# Patient Record
Sex: MALE | Race: WHITE | NOT HISPANIC OR LATINO | Employment: FULL TIME | URBAN - METROPOLITAN AREA
[De-identification: names, ages, dates, MRNs, and addresses within clinical notes are randomized per-mention and may not be internally consistent; named-entity substitution may affect disease eponyms.]

---

## 2017-01-01 ENCOUNTER — HOSPITAL ENCOUNTER (EMERGENCY)
Facility: HOSPITAL | Age: 47
Discharge: HOME | End: 2017-01-01
Attending: EMERGENCY MEDICINE | Admitting: EMERGENCY MEDICINE

## 2017-01-01 ENCOUNTER — APPOINTMENT (OUTPATIENT)
Dept: CT IMAGING | Facility: HOSPITAL | Age: 47
End: 2017-01-01

## 2017-01-01 VITALS
WEIGHT: 180 LBS | RESPIRATION RATE: 18 BRPM | BODY MASS INDEX: 27.28 KG/M2 | SYSTOLIC BLOOD PRESSURE: 123 MMHG | HEIGHT: 68 IN | OXYGEN SATURATION: 98 % | HEART RATE: 64 BPM | TEMPERATURE: 98 F | DIASTOLIC BLOOD PRESSURE: 89 MMHG

## 2017-01-01 DIAGNOSIS — R10.31 RIGHT LOWER QUADRANT ABDOMINAL PAIN: Primary | ICD-10-CM

## 2017-01-01 DIAGNOSIS — R31.9 HEMATURIA: ICD-10-CM

## 2017-01-01 LAB
ALBUMIN SERPL-MCNC: 4.8 G/DL (ref 3.2–4.8)
ALBUMIN/GLOB SERPL: 1.7 G/DL (ref 1.5–2.5)
ALP SERPL-CCNC: 66 U/L (ref 25–100)
ALT SERPL W P-5'-P-CCNC: 23 U/L (ref 7–40)
ANION GAP SERPL CALCULATED.3IONS-SCNC: 8 MMOL/L (ref 3–11)
AST SERPL-CCNC: 31 U/L (ref 0–33)
BACTERIA UR QL AUTO: ABNORMAL /HPF
BASOPHILS # BLD AUTO: 0.02 10*3/MM3 (ref 0–0.2)
BASOPHILS NFR BLD AUTO: 0.3 % (ref 0–1)
BILIRUB SERPL-MCNC: 2.1 MG/DL (ref 0.3–1.2)
BILIRUB UR QL STRIP: ABNORMAL
BUN BLD-MCNC: 14 MG/DL (ref 9–23)
BUN/CREAT SERPL: 20 (ref 7–25)
CALCIUM SPEC-SCNC: 10 MG/DL (ref 8.7–10.4)
CHLORIDE SERPL-SCNC: 105 MMOL/L (ref 99–109)
CLARITY UR: ABNORMAL
CO2 SERPL-SCNC: 30 MMOL/L (ref 20–31)
COLOR UR: YELLOW
CREAT BLD-MCNC: 0.7 MG/DL (ref 0.6–1.3)
DEPRECATED RDW RBC AUTO: 42.2 FL (ref 37–54)
EOSINOPHIL # BLD AUTO: 0.25 10*3/MM3 (ref 0.1–0.3)
EOSINOPHIL NFR BLD AUTO: 3.6 % (ref 0–3)
ERYTHROCYTE [DISTWIDTH] IN BLOOD BY AUTOMATED COUNT: 12.7 % (ref 11.3–14.5)
GFR SERPL CREATININE-BSD FRML MDRD: 123 ML/MIN/1.73
GLOBULIN UR ELPH-MCNC: 2.8 GM/DL
GLUCOSE BLD-MCNC: 89 MG/DL (ref 70–100)
GLUCOSE UR STRIP-MCNC: NEGATIVE MG/DL
HCT VFR BLD AUTO: 44 % (ref 38.9–50.9)
HGB BLD-MCNC: 15.4 G/DL (ref 13.1–17.5)
HGB UR QL STRIP.AUTO: ABNORMAL
HOLD SPECIMEN: NORMAL
HOLD SPECIMEN: NORMAL
HYALINE CASTS UR QL AUTO: ABNORMAL /LPF
IMM GRANULOCYTES # BLD: 0.02 10*3/MM3 (ref 0–0.03)
IMM GRANULOCYTES NFR BLD: 0.3 % (ref 0–0.6)
KETONES UR QL STRIP: ABNORMAL
LEUKOCYTE ESTERASE UR QL STRIP.AUTO: ABNORMAL
LIPASE SERPL-CCNC: 33 U/L (ref 6–51)
LYMPHOCYTES # BLD AUTO: 1.59 10*3/MM3 (ref 0.6–4.8)
LYMPHOCYTES NFR BLD AUTO: 23.1 % (ref 24–44)
MCH RBC QN AUTO: 31.6 PG (ref 27–31)
MCHC RBC AUTO-ENTMCNC: 35 G/DL (ref 32–36)
MCV RBC AUTO: 90.3 FL (ref 80–99)
MONOCYTES # BLD AUTO: 0.55 10*3/MM3 (ref 0–1)
MONOCYTES NFR BLD AUTO: 8 % (ref 0–12)
NEUTROPHILS # BLD AUTO: 4.45 10*3/MM3 (ref 1.5–8.3)
NEUTROPHILS NFR BLD AUTO: 64.7 % (ref 41–71)
NITRITE UR QL STRIP: NEGATIVE
PH UR STRIP.AUTO: 5.5 [PH] (ref 5–8)
PLATELET # BLD AUTO: 235 10*3/MM3 (ref 150–450)
PMV BLD AUTO: 8.8 FL (ref 6–12)
POTASSIUM BLD-SCNC: 3.9 MMOL/L (ref 3.5–5.5)
PROT SERPL-MCNC: 7.6 G/DL (ref 5.7–8.2)
PROT UR QL STRIP: NEGATIVE
RBC # BLD AUTO: 4.87 10*6/MM3 (ref 4.2–5.76)
RBC # UR: ABNORMAL /HPF
REF LAB TEST METHOD: ABNORMAL
SODIUM BLD-SCNC: 143 MMOL/L (ref 132–146)
SP GR UR STRIP: 1.03 (ref 1–1.03)
SQUAMOUS #/AREA URNS HPF: ABNORMAL /HPF
UROBILINOGEN UR QL STRIP: ABNORMAL
WBC NRBC COR # BLD: 6.88 10*3/MM3 (ref 3.5–10.8)
WBC UR QL AUTO: ABNORMAL /HPF
WHOLE BLOOD HOLD SPECIMEN: NORMAL
WHOLE BLOOD HOLD SPECIMEN: NORMAL

## 2017-01-01 PROCEDURE — 0 IOPAMIDOL 61 % SOLUTION: Performed by: EMERGENCY MEDICINE

## 2017-01-01 PROCEDURE — 99284 EMERGENCY DEPT VISIT MOD MDM: CPT

## 2017-01-01 PROCEDURE — 81001 URINALYSIS AUTO W/SCOPE: CPT | Performed by: EMERGENCY MEDICINE

## 2017-01-01 PROCEDURE — 87086 URINE CULTURE/COLONY COUNT: CPT | Performed by: EMERGENCY MEDICINE

## 2017-01-01 PROCEDURE — 85025 COMPLETE CBC W/AUTO DIFF WBC: CPT | Performed by: EMERGENCY MEDICINE

## 2017-01-01 PROCEDURE — 83690 ASSAY OF LIPASE: CPT | Performed by: EMERGENCY MEDICINE

## 2017-01-01 PROCEDURE — 96360 HYDRATION IV INFUSION INIT: CPT

## 2017-01-01 PROCEDURE — 74177 CT ABD & PELVIS W/CONTRAST: CPT

## 2017-01-01 PROCEDURE — 25510000001 DIATRIZOATE MEGLUMINE & SODIUM PER 1 ML: Performed by: NURSE PRACTITIONER

## 2017-01-01 PROCEDURE — 80053 COMPREHEN METABOLIC PANEL: CPT | Performed by: EMERGENCY MEDICINE

## 2017-01-01 RX ORDER — SODIUM CHLORIDE 0.9 % (FLUSH) 0.9 %
10 SYRINGE (ML) INJECTION AS NEEDED
Status: DISCONTINUED | OUTPATIENT
Start: 2017-01-01 | End: 2017-01-01 | Stop reason: HOSPADM

## 2017-01-01 RX ADMIN — SODIUM CHLORIDE 1000 ML: 9 INJECTION, SOLUTION INTRAVENOUS at 16:12

## 2017-01-01 RX ADMIN — DIATRIZOATE MEGLUMINE AND DIATRIZOATE SODIUM 15 ML: 660; 100 LIQUID ORAL; RECTAL at 16:13

## 2017-01-01 RX ADMIN — IOPAMIDOL 85 ML: 612 INJECTION, SOLUTION INTRAVENOUS at 17:37

## 2017-01-01 NOTE — ED PROVIDER NOTES
Subjective   Patient is a 46 y.o. male presenting with abdominal pain.   Abdominal Pain   Pain location:  RLQ  Pain quality: aching    Pain radiation: right flank.  Pain severity:  Moderate  Onset quality:  Gradual  Duration:  1 day  Timing:  Constant  Progression:  Unchanged  Chronicity:  New  Relieved by:  Nothing  Worsened by:  Nothing  Associated symptoms: no diarrhea, no nausea and no shortness of breath        Review of Systems   Respiratory: Negative for shortness of breath.    Gastrointestinal: Positive for abdominal pain. Negative for diarrhea and nausea.   All other systems reviewed and are negative.      History reviewed. No pertinent past medical history.    No Known Allergies    Past Surgical History   Procedure Laterality Date   • Cholecystectomy         History reviewed. No pertinent family history.    Social History     Social History   • Marital status:      Spouse name: N/A   • Number of children: N/A   • Years of education: N/A     Social History Main Topics   • Smoking status: Former Smoker   • Smokeless tobacco: Never Used      Comment: QUIT OVER 20 YEARS AGO   • Alcohol use No   • Drug use: No   • Sexual activity: Defer     Other Topics Concern   • None     Social History Narrative   • None           Objective   Physical Exam   Constitutional: He is oriented to person, place, and time. He appears well-developed and well-nourished.   HENT:   Head: Normocephalic and atraumatic.   Right Ear: External ear normal.   Left Ear: External ear normal.   Nose: Nose normal.   Mouth/Throat: Oropharynx is clear and moist.   Eyes: Conjunctivae and EOM are normal. Pupils are equal, round, and reactive to light.   Neck: Normal range of motion. Neck supple.   Cardiovascular: Normal rate, regular rhythm, normal heart sounds and intact distal pulses.    Pulmonary/Chest: Effort normal and breath sounds normal.   Abdominal: Soft. Bowel sounds are normal. There is tenderness in the right lower quadrant. There  is no rebound.   Musculoskeletal: Normal range of motion.   Neurological: He is alert and oriented to person, place, and time.   Skin: Skin is warm and dry.   Psychiatric: He has a normal mood and affect. His behavior is normal. Judgment normal.       Procedures         ED Course  ED Course   Comment By Time   CT neg for appy or other acute. Pt reports pain feeling better. Well aware of the ss of worsening condition.    All thankful and agreeable. Fu with pcp this week. ESPERANZA Bell 01/01 1903                  [facility]    Final diagnoses:   Right lower quadrant abdominal pain   Hematuria            ESPERANZA Bell  01/01/17 [id number]

## 2017-01-03 LAB — BACTERIA SPEC AEROBE CULT: NORMAL

## 2019-05-15 RX ORDER — SODIUM, POTASSIUM,MAG SULFATES 17.5-3.13G
SOLUTION, RECONSTITUTED, ORAL ORAL
Qty: 2 BOTTLE | Refills: 0 | Status: SHIPPED | OUTPATIENT
Start: 2019-05-15 | End: 2022-03-21

## 2019-06-04 ENCOUNTER — LAB REQUISITION (OUTPATIENT)
Dept: LAB | Facility: HOSPITAL | Age: 49
End: 2019-06-04

## 2019-06-04 ENCOUNTER — OUTSIDE FACILITY SERVICE (OUTPATIENT)
Dept: GASTROENTEROLOGY | Facility: CLINIC | Age: 49
End: 2019-06-04

## 2019-06-04 DIAGNOSIS — Z12.11 ENCOUNTER FOR SCREENING FOR MALIGNANT NEOPLASM OF COLON: ICD-10-CM

## 2019-06-04 DIAGNOSIS — Z86.010 HISTORY OF COLONIC POLYPS: ICD-10-CM

## 2019-06-04 DIAGNOSIS — Z80.0 FAMILY HISTORY OF MALIGNANT NEOPLASM OF DIGESTIVE ORGAN: ICD-10-CM

## 2019-06-04 PROCEDURE — 45380 COLONOSCOPY AND BIOPSY: CPT | Performed by: INTERNAL MEDICINE

## 2019-06-04 PROCEDURE — 88305 TISSUE EXAM BY PATHOLOGIST: CPT | Performed by: INTERNAL MEDICINE

## 2019-06-05 LAB
CYTO UR: NORMAL
LAB AP CASE REPORT: NORMAL
LAB AP CLINICAL INFORMATION: NORMAL
PATH REPORT.FINAL DX SPEC: NORMAL
PATH REPORT.GROSS SPEC: NORMAL

## 2022-03-21 ENCOUNTER — OFFICE VISIT (OUTPATIENT)
Dept: UROLOGY | Facility: CLINIC | Age: 52
End: 2022-03-21

## 2022-03-21 VITALS
BODY MASS INDEX: 24.95 KG/M2 | HEART RATE: 86 BPM | OXYGEN SATURATION: 98 % | DIASTOLIC BLOOD PRESSURE: 78 MMHG | SYSTOLIC BLOOD PRESSURE: 126 MMHG | HEIGHT: 68 IN | WEIGHT: 164.6 LBS

## 2022-03-21 DIAGNOSIS — N35.912 STRICTURE OF BULBOUS URETHRA IN MALE, UNSPECIFIED STRICTURE TYPE: ICD-10-CM

## 2022-03-21 DIAGNOSIS — N39.0 ACUTE UTI: Primary | ICD-10-CM

## 2022-03-21 LAB
BILIRUB BLD-MCNC: NEGATIVE MG/DL
CLARITY, POC: CLEAR
COLOR UR: YELLOW
EXPIRATION DATE: NORMAL
GLUCOSE UR STRIP-MCNC: NEGATIVE MG/DL
KETONES UR QL: NEGATIVE
LEUKOCYTE EST, POC: NEGATIVE
Lab: NORMAL
NITRITE UR-MCNC: NEGATIVE MG/ML
PH UR: 6 [PH] (ref 5–8)
PROT UR STRIP-MCNC: NEGATIVE MG/DL
RBC # UR STRIP: NEGATIVE /UL
SP GR UR: 1.01 (ref 1–1.03)
UROBILINOGEN UR QL: NORMAL

## 2022-03-21 PROCEDURE — 51798 US URINE CAPACITY MEASURE: CPT | Performed by: STUDENT IN AN ORGANIZED HEALTH CARE EDUCATION/TRAINING PROGRAM

## 2022-03-21 PROCEDURE — 99204 OFFICE O/P NEW MOD 45 MIN: CPT | Performed by: STUDENT IN AN ORGANIZED HEALTH CARE EDUCATION/TRAINING PROGRAM

## 2022-03-21 PROCEDURE — 81003 URINALYSIS AUTO W/O SCOPE: CPT | Performed by: STUDENT IN AN ORGANIZED HEALTH CARE EDUCATION/TRAINING PROGRAM

## 2022-03-21 NOTE — PROGRESS NOTES
LUTS Male Office Visit      Patient Name: Stewart Oneil  : 1970   MRN: [id number]     Chief Complaint:  Lower Urinary Tract Symptoms.   Chief Complaint   Patient presents with   • New Patient   • Acute UTI        Referring Provider: Royer Paredes MD    History of Present Illness: Mr. Oneil is a 51 y.o. male with history of lower urinary tract symptoms and possible urethral stricture, has had 2 prior urethral dilations, presents to establish care.     Has a history of Chlamydia prior to diagnosis of urethral stricture, in college treated with abx.  States in his 20s he had a weak urinary stream, he presented to his urologist, he does not remember who or where, underwent a cystoscopy which identified urethral stricture, unclear location.  He then underwent subsequent urethral stricture dilation while awake x2.    Hasn't had any urology follow up in 30 years.  He states since developing a few urinary tract infections over the last few years, diagnosed in [place], [place], he was more motivated to establish care with urologist.    Patient now struggles with weak urinary stream, states it is very weak in the morning and he has urinary hesitancy and intermittency.  He denies any nocturia.  His IPSS score is 21, severe symptoms, he does report some straining with urination.  Was diagnosed with urinary tract infection 2021; unclear what bacteria grew out but a urine culture was performed at a local hospital.  I do not have records of his urine culture, his urinalysis was positive for nitrite and leukocyte esterase on outside records at that time.    In regards to etiology of urethral stricture, he denies any perineal trauma or pelvic trauma.  Only risk factor is prior episode of chlamydia in college.    Patient is sexually active and , denies issues with ejaculation, non painful and normal volume. He has 2 children.     Patient is retired from [employer]. He  currently still works in the [employer] in [facility].    He is active, jogs 5 days a week.    Residual bladder scan indicates 34 mL, emptying fairly well.    Subjective      Review of System: Review of Systems   Constitutional: Negative for chills, fatigue, fever and unexpected weight change.   HENT: Negative for sore throat.    Eyes: Negative for visual disturbance.   Respiratory: Negative for cough, chest tightness and shortness of breath.    Cardiovascular: Negative for chest pain and leg swelling.   Gastrointestinal: Negative for blood in stool, constipation, diarrhea, nausea, rectal pain and vomiting.   Genitourinary: Negative for decreased urine volume, difficulty urinating, dysuria, enuresis, flank pain, frequency, genital sores, hematuria and urgency.   Musculoskeletal: Negative for back pain and joint swelling.   Skin: Negative for rash and wound.   Neurological: Negative for seizures, speech difficulty, weakness and headaches.   Psychiatric/Behavioral: Negative for confusion, sleep disturbance and suicidal ideas. The patient is not nervous/anxious.       I have reviewed the ROS documented by my clinical staff, I have updated appropriately and I agree. Fracisco Trujillo MD    Past Medical History:  History reviewed. No pertinent past medical history.    Past Surgical History:  Past Surgical History:   Procedure Laterality Date   • CHOLECYSTECTOMY         Medications:    Current Outpatient Medications:   •  sodium-potassium-magnesium sulfates (SUPREP BOWEL PREP KIT) 17.5-3.13-1.6 GM/177ML solution oral solution, Please follow the directions mailed to you by our office. If you have any questions call our office at [phone], Disp: 2 bottle, Rfl: 0    Allergies:  No Known Allergies    Social History:  Social History     Socioeconomic History   • Marital status:    Tobacco Use   • Smoking status: Former Smoker   • Smokeless tobacco: Never Used   • Tobacco comment: QUIT OVER  20 YEARS AGO   Vaping Use   • Vaping Use: Never used   Substance and Sexual Activity   • Alcohol use: No   • Drug use: No   • Sexual activity: Yes     Partners: Female       Family History:  History reviewed. No pertinent family history.    IPSS Questionnaire (AUA-7):  Over the past month…    1)  Incomplete Emptying:       How often have you had a sensation of not emptying you had the sensation of not emptying your bladder completely after you finished urinating?  3 - About half the time   2)  Frequency:       How often have you had the urinate again less than two hours after you finished urinating?  3 - About half the time   3)  Intermittency:       How often have you found you stopped and started again several times when you urinated?   4 - More than half the time   4) Urgency:      How often have you found it difficult to postpone urination?  2 - Less than half the time   5) Weak Stream:      How often have you had a weak urinary stream?  4 - More than half the time   6) Straining:       How often have you had to push or strain to begin urination?  4 - More than half the time   7) Nocturia:      How many times did you most typically get up to urinate from the time you went to bed at night until the time you got up in the morning?  1 - 1 time   Total Score:  21   The International Prostate Symptom Score (IPSS) is used to screen, diagnose, track symptoms of benign prostatic hyperplasia (BPH).   0-7 (Mild Symptoms) 8-19 (Moderate) 20-35 (Severe)   Quality of Life (QoL):  If you were to spend the rest of your life with your urinary condition just the way it is now, how would you feel about that? 3-Mixed   Urine Leakage (Incontinence) 0-No Leakage       Sexual Health Inventory for Men (ROM)   Over the past 6 months:     1. How do you rate your confidence that you could get and keep an erection?  2 - Low   2. When you had erections with sexual  stimulation, how often were your erections hard enough for penetration  "(entering your partner)?  4 - Most times ( much more than, half the time)   3. During sexual intercourse, how often were you able to maintain your erection after you had penetrated (entered) your partner?  4 - Most times ( much more than, half the time)   4. During sexual intercourse, how difficult was it to maintain your erection to completion of intercourse?  4 - Sightly difficult    5. When you attempted sexual intercourse, how often was it satisfactory for you?  4 - Most times ( much more than, half the time)    Total Score: 18   The Sexual Health Inventory for Men further classifies ED severity with the following breakpoints:   1-7 (Severe ED) 8-11 (Moderate ED) 12-16 (Mild to Moderate ED) 17-21 (Mild ED)      Post void residual bladder scan:   34 mL     Objective     Physical Exam:   Vital Signs:   Vitals:    03/21/22 0934   BP: 126/78   Pulse: 86   SpO2: 98%   Weight: 74.7 kg (164 lb 9.6 oz)   Height: 172.7 cm (68\")     Body mass index is 25.03 kg/m².     Physical Exam  Vitals and nursing note reviewed.   Constitutional:       Appearance: Normal appearance.   HENT:      Head: Normocephalic and atraumatic.      Mouth/Throat:      Mouth: Mucous membranes are moist.      Pharynx: Oropharynx is clear.   Eyes:      Extraocular Movements: Extraocular movements intact.      Conjunctiva/sclera: Conjunctivae normal.   Cardiovascular:      Rate and Rhythm: Normal rate and regular rhythm.   Pulmonary:      Effort: Pulmonary effort is normal. No respiratory distress.   Abdominal:      Palpations: Abdomen is soft.      Tenderness: There is no abdominal tenderness. There is no right CVA tenderness or left CVA tenderness.   Genitourinary:     Comments: Uncircumcised phallus, orthotopic meatus, bilaterally descended testicles without masses, or lesions.  No palpable scars or nodularity along the bulbar urethra.    MARLA: Deferred.   Musculoskeletal:         General: Normal range of motion.      Cervical back: Normal range of " motion.   Skin:     General: Skin is warm and dry.   Neurological:      General: No focal deficit present.      Mental Status: He is alert and oriented to person, place, and time.   Psychiatric:         Mood and Affect: Mood normal.         Behavior: Behavior normal.         Labs:   No results found for: PSA    Brief Urine Lab Results  (Last result in the past 365 days)      Color   Clarity   Blood   Leuk Est   Nitrite   Protein   CREAT   Urine HCG        03/21/22 0948 Yellow   Clear   Negative   Negative   Negative   Negative                      Lab Results   Component Value Date    GLUCOSE 89 01/01/2017    CALCIUM 10.0 01/01/2017     01/01/2017    K 3.9 01/01/2017    CO2 30.0 01/01/2017     01/01/2017    BUN 14 01/01/2017    CREATININE 0.70 01/01/2017    EGFRIFNONA 123 01/01/2017    BCR 20.0 01/01/2017    ANIONGAP 8.0 01/01/2017       Lab Results   Component Value Date    WBC 6.88 01/01/2017    HGB 15.4 01/01/2017    HCT 44.0 01/01/2017    MCV 90.3 01/01/2017     01/01/2017       Images:   No Images in the past 120 days found..    Measures:   Tobacco:   Stewart Oneil  reports that he has quit smoking. He has never used smokeless tobacco.          Assessment / Plan      Assessment:  Mr. Oneil is a 51 y.o. male who presented today with recent episodes of urinary tract infection over the last year, and a history of urethral stricture, unclear location.  He developed a stricture in his 20s likely as a result of sexual transmitted infection with chlamydia treated with antibiotics.  He has no other risk factors.  He underwent urethral dilation x2 with flexible cystoscopy as a young man.  He has not had urology follow-up in 30 years.  Now his urinary stream has weakened and he complains of hesitancy, intermittency, and straining with urination.  Given his recurrent urinary tract infection development and weak urinary stream, he likely has a recurrence of his urethral stricture.  We discussed the  first step would be to perform flexible cystoscopy to identify stricture.  If present, we discussed proceeding with a retrograde urethrogram to identify length and severity of stricture.  I discussed my recommendation to refer the patient to the [facility] reconstructive urologist for consideration of urethroplasty (anastomotic versus buccal mucosal grafting) pending his work-up given that this is the most definitive option with the best long-term success rates, as compared to success of urethral dilation or DVIU which will result in recurrence nearly 100% of the time.  Patient reports understanding.  We discussed the risks of flexible cystoscopy in clinic including infection, bleeding, dysuria.    Diagnoses and all orders for this visit:    1. Acute UTI (Primary)  - Prior UTI Dec 2021, UA negative today    -     POC Urinalysis Dipstick, Automated    2. Stricture of bulbous urethra in male, unspecified stricture type  - flexible cystoscopy 4/1/22, if stricture present will recommend retrograde urethrogram and referral to  Recontructive Urologists for consideration of urethroplasty           Follow Up:   Return in about 11 days (around 4/1/2022) for Fri April 1 flex cystoscopy in clinic .    I spent approximately 45 minutes providing clinical care for this patient; including review of patient's chart and provider documentation, face to face time spent with patient in examination room (obtaining history, performing physical exam, discussing diagnosis and management options), placing orders, and completing patient documentation.     Fracisco Trujillo MD  [facility] [place]

## 2022-04-01 ENCOUNTER — OFFICE VISIT (OUTPATIENT)
Dept: UROLOGY | Facility: CLINIC | Age: 52
End: 2022-04-01

## 2022-04-01 VITALS
HEART RATE: 86 BPM | HEIGHT: 68 IN | OXYGEN SATURATION: 98 % | SYSTOLIC BLOOD PRESSURE: 142 MMHG | WEIGHT: 164 LBS | DIASTOLIC BLOOD PRESSURE: 76 MMHG | BODY MASS INDEX: 24.86 KG/M2

## 2022-04-01 DIAGNOSIS — N35.912 STRICTURE OF BULBOUS URETHRA IN MALE, UNSPECIFIED STRICTURE TYPE: Primary | ICD-10-CM

## 2022-04-01 PROCEDURE — 52000 CYSTOURETHROSCOPY: CPT | Performed by: STUDENT IN AN ORGANIZED HEALTH CARE EDUCATION/TRAINING PROGRAM

## 2022-04-01 NOTE — PROGRESS NOTES
Preprocedure diagnosis  LUTS   Hx urethral stricture    Postprocedure diagnosis  10 Fr distal bulbar urethral stricture     Procedure  Flexible Cystourethroscopy    Attending surgeon  Fracisco Trujillo MD    Anesthesia  2% lidocaine jelly intraurethrally    Complications  None    Indications  51 y.o. male undergoing a flexible cystoscopy for the above mentioned indications.  Informed consent was obtained.      Findings  8-10 [place] distal bulbar urethral stricture, unable to assess stricture length on cystoscopy      Procedure  The patient was placed in supine position and prepped and draped in sterile fashion with lidocaine jelly instill per the urethra for anesthesia 5 minutes prior to procedure start.  A brief timeout was performed including available nursing staff and the patient.  The 16 Fr digital flexible cystoscope was lubricated and gently advanced into the urethral meatus. The penile urethra appeared normal, the scope was advanced into the bulbar urethra, the urethra was widely patent however it narrowed down to a significant stricture roughly estimated at 8-10 [place] within the distal bulbar urethra.  The cystoscope was not able to be advanced past this, I could not see past the stricture as well, could not easily estimate the length of the stricture.  The scope was removed.      Follow Up: We discussed the findings of significant 8-10 [place] bulbar urethral stricture, we will need to formally assess this with retrograde urethrogram.  Options for management include formal urethral dilation or incision in the operating room however we discussed the recurrence risk of this is nearly 100% at some point over time, versus referral to reconstructive urology specialist for consideration of formal anastomotic versus buccal urethroplasty.  Patient is in agreement, we will wait for the imaging to be able to assess the length and severity of his urethral stricture, I will call him to discuss results and we will  make decisions for treatment planning after this.    PLAN  - Retrograde urethrogram  - call with results  - referral to [facility] for consideration of anastomotic versus buccal urethroplasty pending stricture length, versus operative intervention for incision or dilation    Fracisco Trujillo MD  [facility]

## 2022-05-02 ENCOUNTER — TELEPHONE (OUTPATIENT)
Dept: UROLOGY | Facility: CLINIC | Age: 52
End: 2022-05-02

## 2022-05-02 NOTE — TELEPHONE ENCOUNTER
I SPOKE WITH THE PT AND GOT HIM SENT OVER TO CENTRAL SCHEDULING TO PICK A NEW DAY THAT WORKS FOR HIM.

## 2022-05-03 ENCOUNTER — HOSPITAL ENCOUNTER (OUTPATIENT)
Dept: GENERAL RADIOLOGY | Facility: HOSPITAL | Age: 52
End: 2022-05-03

## 2022-05-23 ENCOUNTER — HOSPITAL ENCOUNTER (OUTPATIENT)
Dept: GENERAL RADIOLOGY | Facility: HOSPITAL | Age: 52
Discharge: HOME | End: 2022-05-23
Admitting: RADIOLOGY

## 2022-05-23 DIAGNOSIS — N35.912 STRICTURE OF BULBOUS URETHRA IN MALE, UNSPECIFIED STRICTURE TYPE: ICD-10-CM

## 2022-05-23 PROCEDURE — 0 IOTHALAMATE MEGLUMINE 17.2 % SOLUTION: Performed by: STUDENT IN AN ORGANIZED HEALTH CARE EDUCATION/TRAINING PROGRAM

## 2022-05-23 PROCEDURE — 74450 X-RAY URETHRA/BLADDER: CPT

## 2022-05-23 RX ADMIN — IOTHALAMATE MEGLUMINE 20 ML: 172 INJECTION URETERAL at 09:14

## 2022-05-26 ENCOUNTER — TELEPHONE (OUTPATIENT)
Dept: UROLOGY | Facility: CLINIC | Age: 52
End: 2022-05-26

## 2022-05-26 DIAGNOSIS — N35.912 BULBOUS URETHRAL STRICTURE: Primary | ICD-10-CM

## 2022-05-26 NOTE — TELEPHONE ENCOUNTER
Call patient back with results of his retrograde urethrogram demonstrating a fairly significant bulbar urethral stricture, this is roughly 7 to 10 mm in size, he reports he is urinating within somewhat reduced stream but overall he is doing okay.  He has had prior dilations of this area.  At this point I recommend a sitdown discussion with a reconstructive urologist at the [facility] to discuss formal repair given that any formal dilations or incisions are going to just continue to propagate the scar formation.  Patient is amenable to reconstructive urologic referral to .  Stricture length is likely amenable to anastomotic urethroplasty.    Fracisco Trujillo MD

## 2022-05-26 NOTE — PROGRESS NOTES
7 to 10 mm bulbar urethral stricture, call patient, he will be referred to [facility] reconstructive urologist for consideration of anastomotic urethroplasty.

## 2023-07-10 NOTE — TELEPHONE ENCOUNTER
* Dilated ascending aorta measuring 4.90 cm.    * Normal left ventricular size with mild concentric hypertrophy and normal  systolic function, EF 55%, borderline GLS -16.4 %.    * Mildly increased right ventricular chamber size.    * Normal right ventricular systolic function.    * Normal right ventricular systolic pressure; 33 mmHg, RAP 8 mmHg.    * Mild-moderate tricuspid valve regurgitation.    * Well seated #23 Pena MagnaEase 1/2017 with mean gradient of 16 mmHg.    * Mildly increased left atrial chamber size.    * No pericardial effusion.      Normal EF, normal AVR -   Known dilated ascending aorta - stable  Abdominal aorta was assessed to be normal on 2/22 CT DELETE AFTER REVIEWING: Telephone encounter to be sent to the clinical pool   Hub staff attempted to follow warm transfer process and was unsuccessful     Caller: Stewart Oneil    Relationship to patient: Self    Best call back number: [phone]    Patient is needing: PT HAS AN APPT FOR TOMORROW AND HE NEEDS TO RESCHEDULE, PLEASE GIVE PT A CALL BACK   5/3/2022 Status: Char   Time: 8:15 AM Length: 30   Visit Type: [place] HENRIQUE INJ RETROGRADE URETHROCYST [[id number]]

## 2024-05-28 RX ORDER — SODIUM, POTASSIUM,MAG SULFATES 17.5-3.13G
SOLUTION, RECONSTITUTED, ORAL ORAL
Qty: 354 ML | Refills: 0 | Status: SHIPPED | OUTPATIENT
Start: 2024-05-28

## 2024-06-07 ENCOUNTER — OUTSIDE FACILITY SERVICE (OUTPATIENT)
Dept: GASTROENTEROLOGY | Facility: CLINIC | Age: 54
End: 2024-06-07
Payer: COMMERCIAL

## 2024-06-07 PROCEDURE — 88305 TISSUE EXAM BY PATHOLOGIST: CPT | Performed by: INTERNAL MEDICINE

## 2024-06-07 PROCEDURE — 45385 COLONOSCOPY W/LESION REMOVAL: CPT | Performed by: INTERNAL MEDICINE

## 2024-06-10 ENCOUNTER — LAB REQUISITION (OUTPATIENT)
Dept: LAB | Facility: HOSPITAL | Age: 54
End: 2024-06-10
Payer: COMMERCIAL

## 2024-06-10 DIAGNOSIS — D12.3 BENIGN NEOPLASM OF TRANSVERSE COLON: ICD-10-CM

## 2024-06-10 DIAGNOSIS — Z12.11 ENCOUNTER FOR SCREENING FOR MALIGNANT NEOPLASM OF COLON: ICD-10-CM

## 2024-06-10 DIAGNOSIS — Z80.0 FAMILY HISTORY OF MALIGNANT NEOPLASM OF DIGESTIVE ORGANS: ICD-10-CM

## 2024-06-10 DIAGNOSIS — Z86.010 PERSONAL HISTORY OF COLONIC POLYPS: ICD-10-CM

## 2024-06-11 LAB — REF LAB TEST METHOD: NORMAL
